# Patient Record
Sex: MALE | Race: WHITE | NOT HISPANIC OR LATINO | URBAN - METROPOLITAN AREA
[De-identification: names, ages, dates, MRNs, and addresses within clinical notes are randomized per-mention and may not be internally consistent; named-entity substitution may affect disease eponyms.]

---

## 2022-05-18 ENCOUNTER — CLAIMS CREATED FROM THE CLAIM WINDOW (OUTPATIENT)
Dept: URBAN - METROPOLITAN AREA MEDICAL CENTER 2 | Facility: MEDICAL CENTER | Age: 77
End: 2022-05-18
Payer: MEDICARE

## 2022-05-18 DIAGNOSIS — Z79.1 ENCNTR LONG-TERM NSAID USE: ICD-10-CM

## 2022-05-18 DIAGNOSIS — D50.0 IRON DEFICIENCY ANEMIA SECONDARY TO BLOOD LOSS (CHRONIC): ICD-10-CM

## 2022-05-18 DIAGNOSIS — N18.9 CKD (CHRONIC KIDNEY DISEASE): ICD-10-CM

## 2022-05-18 DIAGNOSIS — R10.13 EPIGASTRIC PAIN: ICD-10-CM

## 2022-05-18 DIAGNOSIS — R19.5 ABNORMAL FECES: ICD-10-CM

## 2022-05-18 PROCEDURE — 99222 1ST HOSP IP/OBS MODERATE 55: CPT | Performed by: INTERNAL MEDICINE

## 2022-05-18 PROCEDURE — 99223 1ST HOSP IP/OBS HIGH 75: CPT | Performed by: INTERNAL MEDICINE

## 2022-05-19 ENCOUNTER — CLAIMS CREATED FROM THE CLAIM WINDOW (OUTPATIENT)
Dept: URBAN - METROPOLITAN AREA MEDICAL CENTER 2 | Facility: MEDICAL CENTER | Age: 77
End: 2022-05-19
Payer: MEDICARE

## 2022-05-19 DIAGNOSIS — K29.60 ADENOPAPILLOMATOSIS GASTRICA: ICD-10-CM

## 2022-05-19 DIAGNOSIS — K20.80 LOS ANGELES GRADE A ESOPHAGITIS: ICD-10-CM

## 2022-05-19 DIAGNOSIS — K26.4 BLEEDING DUODENAL ULCER: ICD-10-CM

## 2022-05-19 DIAGNOSIS — K92.0 BLOODY VOMITUS: ICD-10-CM

## 2022-05-19 PROCEDURE — 43255 EGD CONTROL BLEEDING ANY: CPT | Performed by: INTERNAL MEDICINE

## 2022-05-19 PROCEDURE — 43239 EGD BIOPSY SINGLE/MULTIPLE: CPT | Performed by: INTERNAL MEDICINE

## 2022-05-20 ENCOUNTER — CLAIMS CREATED FROM THE CLAIM WINDOW (OUTPATIENT)
Dept: URBAN - METROPOLITAN AREA MEDICAL CENTER 2 | Facility: MEDICAL CENTER | Age: 77
End: 2022-05-20
Payer: MEDICARE

## 2022-05-20 DIAGNOSIS — K20.80 LOS ANGELES GRADE A ESOPHAGITIS: ICD-10-CM

## 2022-05-20 DIAGNOSIS — K26.4 CHRONIC OR UNSPECIFIED DUODENAL ULCER WITH HEMORRHAGE: ICD-10-CM

## 2022-05-20 DIAGNOSIS — D62 ACUTE BLOOD LOSS ANEMIA: ICD-10-CM

## 2022-05-20 DIAGNOSIS — R10.816 ABDOMINAL TENDERNESS, EPIGASTRIC: ICD-10-CM

## 2022-05-20 DIAGNOSIS — K29.60 ADENOPAPILLOMATOSIS GASTRICA: ICD-10-CM

## 2022-05-20 PROCEDURE — 99232 SBSQ HOSP IP/OBS MODERATE 35: CPT | Performed by: INTERNAL MEDICINE

## 2022-05-31 ENCOUNTER — OFFICE VISIT (OUTPATIENT)
Dept: URBAN - METROPOLITAN AREA CLINIC 113 | Facility: CLINIC | Age: 77
End: 2022-05-31

## 2022-06-01 ENCOUNTER — OFFICE VISIT (OUTPATIENT)
Dept: URBAN - METROPOLITAN AREA CLINIC 113 | Facility: CLINIC | Age: 77
End: 2022-06-01
Payer: MEDICARE

## 2022-06-01 VITALS
DIASTOLIC BLOOD PRESSURE: 49 MMHG | HEIGHT: 70 IN | BODY MASS INDEX: 23.84 KG/M2 | WEIGHT: 166.5 LBS | TEMPERATURE: 97.4 F | SYSTOLIC BLOOD PRESSURE: 79 MMHG | RESPIRATION RATE: 16 BRPM | HEART RATE: 85 BPM

## 2022-06-01 DIAGNOSIS — K26.9 DUODENAL ULCER: ICD-10-CM

## 2022-06-01 DIAGNOSIS — D50.0 IRON DEFICIENCY ANEMIA DUE TO CHRONIC BLOOD LOSS: ICD-10-CM

## 2022-06-01 DIAGNOSIS — I95.89 OTHER SPECIFIED HYPOTENSION: ICD-10-CM

## 2022-06-01 PROCEDURE — 99203 OFFICE O/P NEW LOW 30 MIN: CPT

## 2022-06-01 RX ORDER — PANTOPRAZOLE SODIUM 40 MG/1
1 TABLET TABLET, DELAYED RELEASE ORAL ONCE A DAY
Qty: 90 TABLET | Refills: 2 | OUTPATIENT
Start: 2022-06-01

## 2022-06-01 RX ORDER — DORZOLAMIDE HYDROCHLORIDE AND TIMOLOL MALEATE 20; 5 MG/ML; MG/ML
1 DROP INTO AFFECTED EYE SOLUTION/ DROPS OPHTHALMIC TWICE A DAY
Status: ACTIVE | COMMUNITY
Start: 2022-06-01

## 2022-06-01 RX ORDER — LATANOPROST 50 UG/ML
1 DROP INTO AFFECTED EYE IN THE EVENING SOLUTION/ DROPS OPHTHALMIC ONCE A DAY
Status: ACTIVE | COMMUNITY
Start: 2022-06-01

## 2022-06-01 RX ORDER — ATORVASTATIN CALCIUM 40 MG/1
1 TABLET TABLET, FILM COATED ORAL ONCE A DAY
Status: ACTIVE | COMMUNITY

## 2022-06-01 NOTE — HPI-TODAY'S VISIT:
76-year-old male with a history of chronic kidney disease, hyperlipidemia presents for follow-up after hospitalization.  He was seen as inpatient consultation at [facility] by Dr. Chaudhry on 2022 for anemia.  Hemoglobin on admission was 7 which trended down to 5.8.  He received 3 units of blood.  Hemoccult was positive.  He was planned for an EGD with consideration of colonoscopy if unremarkable.  Hospital EGD 2022:LA grade a reflux esophagitis.  Small hiatal hernia.  Diffuse gastric erythema which was biopsied to rule out H. pylori.  1 nonobstructing oozing duodenal ulcer with a visible vessel circumferentially injected with epinephrine and cautery ablated.  Pathology revealed mild chronic gastritis with fundic type mucosa, negative for H. pylori.  CT scan of the abdomen/pelvis with IV contrast 2022:Mild constipation.  Several small bilateral intrarenal calculi with no hydronephrosis or perirenal stranding.  No mesenteric induration or free fluid.  Otherwise, negative.  Patient was discharged on 2022 in stable condition.  It was recommended to discontinue diclofenac and continue with PPI therapy twice daily and iron supplement daily for the next 3 to 4 months.   He denies dizziness and light headedness. He denies fatigue however he has been sleeping more than usual. He states his energy is "not bad." He denies shortness of breath. He continues to take iron daily. HE has had two dark stools since his discharge. Patient provided a photo on his cell phone which appeared as dark green stool. He is long taking NSAIDs. He is no longer on PPI therapy. He had a colonoscopy over 20 years ago which revealed polyps. He denies a family history of colon cancer. His father  at 74 from gastric cancer.

## 2022-06-02 LAB
A/G RATIO: 1.8
ALBUMIN: 3.9
ALKALINE PHOSPHATASE: 103
ALT (SGPT): 14
AST (SGOT): 19
BASO (ABSOLUTE): 0.1
BASOS: 1
BILIRUBIN, TOTAL: 0.5
BUN/CREATININE RATIO: 17
BUN: 26
CALCIUM: 9.1
CARBON DIOXIDE, TOTAL: 21
CHLORIDE: 105
CREATININE: 1.57
EGFR: 45
EOS (ABSOLUTE): 0.4
EOS: 5
FERRITIN, SERUM: 98
GLOBULIN, TOTAL: 2.2
GLUCOSE: 165
HEMATOCRIT: 30.4
HEMATOLOGY COMMENTS:: (no result)
HEMOGLOBIN: 10
IMMATURE CELLS: (no result)
IMMATURE GRANS (ABS): 0
IMMATURE GRANULOCYTES: 0
IRON BIND.CAP.(TIBC): 298
IRON SATURATION: 21
IRON: 64
LYMPHS (ABSOLUTE): 0.9
LYMPHS: 12
MCH: 32.3
MCHC: 32.9
MCV: 98
MONOCYTES(ABSOLUTE): 0.4
MONOCYTES: 6
NEUTROPHILS (ABSOLUTE): 5.7
NEUTROPHILS: 76
NRBC: (no result)
PLATELETS: 241
POTASSIUM: 4.6
PROTEIN, TOTAL: 6.1
RBC: 3.1
RDW: 14.7
SODIUM: 140
UIBC: 234
WBC: 7.5

## 2022-07-13 ENCOUNTER — OFFICE VISIT (OUTPATIENT)
Dept: URBAN - METROPOLITAN AREA CLINIC 113 | Facility: CLINIC | Age: 77
End: 2022-07-13
Payer: MEDICARE

## 2022-07-13 VITALS
SYSTOLIC BLOOD PRESSURE: 97 MMHG | HEART RATE: 68 BPM | DIASTOLIC BLOOD PRESSURE: 62 MMHG | HEIGHT: 70 IN | BODY MASS INDEX: 24.05 KG/M2 | TEMPERATURE: 98.3 F | WEIGHT: 168 LBS

## 2022-07-13 DIAGNOSIS — R63.0 DECREASED APPETITE: ICD-10-CM

## 2022-07-13 DIAGNOSIS — K26.9 DUODENAL ULCER: ICD-10-CM

## 2022-07-13 DIAGNOSIS — R53.83 FATIGUE, UNSPECIFIED TYPE: ICD-10-CM

## 2022-07-13 DIAGNOSIS — I95.89 OTHER SPECIFIED HYPOTENSION: ICD-10-CM

## 2022-07-13 DIAGNOSIS — D50.0 IRON DEFICIENCY ANEMIA DUE TO CHRONIC BLOOD LOSS: ICD-10-CM

## 2022-07-13 PROBLEM — 64379006: Status: ACTIVE | Noted: 2022-07-13

## 2022-07-13 PROCEDURE — 99214 OFFICE O/P EST MOD 30 MIN: CPT

## 2022-07-13 RX ORDER — FERROUS SULFATE 325(65) MG
1 TABLET TABLET ORAL ONCE A DAY
Status: ACTIVE | COMMUNITY

## 2022-07-13 RX ORDER — DORZOLAMIDE HYDROCHLORIDE AND TIMOLOL MALEATE 20; 5 MG/ML; MG/ML
1 DROP INTO AFFECTED EYE SOLUTION/ DROPS OPHTHALMIC TWICE A DAY
Status: ON HOLD | COMMUNITY
Start: 2022-06-01

## 2022-07-13 RX ORDER — POLYETHYLENE GLYCOL 3350, SODIUM CHLORIDE, SODIUM BICARBONATE, POTASSIUM CHLORIDE 420; 11.2; 5.72; 1.48 G/4L; G/4L; G/4L; G/4L
420 G POWDER, FOR SOLUTION ORAL ONCE
Qty: 420 G | Refills: 0 | OUTPATIENT
Start: 2022-07-13 | End: 2022-07-14

## 2022-07-13 RX ORDER — DOCUSATE SODIUM 100 MG/1
1 TABLET AS NEEDED TABLET ORAL ONCE A DAY
Status: ACTIVE | COMMUNITY

## 2022-07-13 RX ORDER — PANTOPRAZOLE SODIUM 40 MG/1
1 TABLET TABLET, DELAYED RELEASE ORAL ONCE A DAY
OUTPATIENT
Start: 2022-06-01

## 2022-07-13 RX ORDER — LATANOPROST 50 UG/ML
1 DROP INTO AFFECTED EYE IN THE EVENING SOLUTION/ DROPS OPHTHALMIC ONCE A DAY
Status: ON HOLD | COMMUNITY
Start: 2022-06-01

## 2022-07-13 RX ORDER — ATORVASTATIN CALCIUM 40 MG/1
1 TABLET TABLET, FILM COATED ORAL ONCE A DAY
Status: ACTIVE | COMMUNITY

## 2022-07-13 RX ORDER — PANTOPRAZOLE SODIUM 40 MG/1
1 TABLET TABLET, DELAYED RELEASE ORAL ONCE A DAY
Qty: 90 TABLET | Refills: 2 | Status: ACTIVE | COMMUNITY
Start: 2022-06-01

## 2022-07-13 NOTE — HPI-TODAY'S VISIT:
76-year-old male with a history of chronic kidney disease, hyperlipidemia presents for follow-up.  He was last seen on 6/1/2022 after hospitalization for symptomatic anemia.  He was found to have an upper GI bleed secondary to a bleeding duodenal ulcer.  Ulcer was treated with epinephrine and cauterized.  He did admit to excessive fatigue though denied dizziness or shortness of breath.  He continued to take daily iron supplement.  He did report to dark stools postdischarge though when presented a picture of the stool they appear dark green.  Patient was restarted on PPI therapy and plan for repeat CBC and iron studies to trend.  Of note, he did have a low blood pressure reading of 79/49 during office visit.  There was some concern given recent upper GI bleed.  He was recommended to present to the ER should he experience presyncope, dizziness or weakness.  We would consider repeat EGD pending labs.  Labs 6/2/2022:Normal iron studies, normal ferritin, low hemoglobin of 10, low hematocrit 30.4, normal MCV, elevated glucose 165, elevated creatinine 1.57, low GFR 45, normal LFTs.  As hemoglobin had improved and iron levels were normal, he was recommended to continue with iron supplement for now.  Patient continues to have marked fatigue. He sleeps "15 hours out of the day." He feels has no energy. He denies shortness of breath or chest pain. He denies blood per rectum, hematemesis or melena. He denies abdominal pain or unintentional weight loss. He admits to decreased appetite. He had one normal colonoscopy at the age of 49. He has yet to inform his PCP about his low blood pressure and fatigue. He has had B12 deficiency in the past. He is remaining hydrated. His PCP is Charito Park NP in [place], [place].

## 2022-07-13 NOTE — HPI-OTHER HISTORIES
Hospital EGD 5/19/2022:LA grade a reflux esophagitis.  Small hiatal hernia.  Diffuse gastric erythema which was biopsied to rule out H. pylori.  1 nonobstructing oozing duodenal ulcer with a visible vessel circumferentially injected with epinephrine and cautery ablated.  Pathology revealed mild chronic gastritis with fundic type mucosa, negative for H. pylori.  CT scan of the abdomen/pelvis with IV contrast 5/17/2022:Mild constipation.  Several small bilateral intrarenal calculi with no hydronephrosis or perirenal stranding.  No mesenteric induration or free fluid.  Otherwise, negative.

## 2022-07-14 ENCOUNTER — TELEPHONE ENCOUNTER (OUTPATIENT)
Dept: URBAN - METROPOLITAN AREA CLINIC 113 | Facility: CLINIC | Age: 77
End: 2022-07-14

## 2022-07-14 LAB
A/G RATIO: 1.7
ALBUMIN: 3.9
ALKALINE PHOSPHATASE: 113
ALT (SGPT): 12
AST (SGOT): 16
BASO (ABSOLUTE): 0.1
BASOS: 1
BILIRUBIN, TOTAL: 0.3
BUN/CREATININE RATIO: 19
BUN: 29
C-REACTIVE PROTEIN, QUANT: 2
CALCIUM: 9.5
CARBON DIOXIDE, TOTAL: 21
CHLORIDE: 106
CREATININE: 1.51
EGFR: 47
EOS (ABSOLUTE): 0.6
EOS: 8
FERRITIN, SERUM: 79
FOLATE (FOLIC ACID), SERUM: >20
GLOBULIN, TOTAL: 2.3
GLUCOSE: 126
HEMATOCRIT: 36.6
HEMATOLOGY COMMENTS:: (no result)
HEMOGLOBIN: 12
IMMATURE CELLS: (no result)
IMMATURE GRANS (ABS): 0
IMMATURE GRANULOCYTES: 0
IRON BIND.CAP.(TIBC): 279
IRON SATURATION: 21
IRON: 59
LYMPHS (ABSOLUTE): 1.4
LYMPHS: 18
MCH: 31.3
MCHC: 32.8
MCV: 95
MONOCYTES(ABSOLUTE): 0.6
MONOCYTES: 8
NEUTROPHILS (ABSOLUTE): 5
NEUTROPHILS: 65
NRBC: (no result)
PLATELETS: 175
POTASSIUM: 4.6
PROTEIN, TOTAL: 6.2
RBC: 3.84
RDW: 12
SEDIMENTATION RATE-WESTERGREN: 13
SODIUM: 143
UIBC: 220
VITAMIN B12: 711
WBC: 7.5

## 2022-08-22 ENCOUNTER — CLAIMS CREATED FROM THE CLAIM WINDOW (OUTPATIENT)
Dept: URBAN - METROPOLITAN AREA CLINIC 4 | Facility: CLINIC | Age: 77
End: 2022-08-22
Payer: MEDICARE

## 2022-08-22 ENCOUNTER — OFFICE VISIT (OUTPATIENT)
Dept: URBAN - METROPOLITAN AREA SURGERY CENTER 25 | Facility: SURGERY CENTER | Age: 77
End: 2022-08-22
Payer: MEDICARE

## 2022-08-22 DIAGNOSIS — Q43.8 REDUNDANT COLON: ICD-10-CM

## 2022-08-22 DIAGNOSIS — D50.9 ANEMIA, IRON DEFICIENCY: ICD-10-CM

## 2022-08-22 DIAGNOSIS — K63.89 OTHER SPECIFIED DISEASES OF INTESTINE: ICD-10-CM

## 2022-08-22 DIAGNOSIS — Z12.11 ENCOUNTER FOR SCREENING FOR MALIGNANT NEOPLASM OF COLON: ICD-10-CM

## 2022-08-22 DIAGNOSIS — Z86.010 PERSONAL HISTORY OF COLONIC POLYPS: ICD-10-CM

## 2022-08-22 PROCEDURE — G8907 PT DOC NO EVENTS ON DISCHARG: HCPCS | Performed by: INTERNAL MEDICINE

## 2022-08-22 PROCEDURE — 45385 COLONOSCOPY W/LESION REMOVAL: CPT | Performed by: INTERNAL MEDICINE

## 2022-08-22 PROCEDURE — 88302 TISSUE EXAM BY PATHOLOGIST: CPT | Performed by: PATHOLOGY

## 2022-08-22 RX ORDER — LATANOPROST 50 UG/ML
1 DROP INTO AFFECTED EYE IN THE EVENING SOLUTION/ DROPS OPHTHALMIC ONCE A DAY
Status: ON HOLD | COMMUNITY
Start: 2022-06-01

## 2022-08-22 RX ORDER — PANTOPRAZOLE SODIUM 40 MG/1
1 TABLET TABLET, DELAYED RELEASE ORAL ONCE A DAY
Status: ACTIVE | COMMUNITY
Start: 2022-06-01

## 2022-08-22 RX ORDER — DOCUSATE SODIUM 100 MG/1
1 TABLET AS NEEDED TABLET ORAL ONCE A DAY
Status: ACTIVE | COMMUNITY

## 2022-08-22 RX ORDER — DORZOLAMIDE HYDROCHLORIDE AND TIMOLOL MALEATE 20; 5 MG/ML; MG/ML
1 DROP INTO AFFECTED EYE SOLUTION/ DROPS OPHTHALMIC TWICE A DAY
Status: ON HOLD | COMMUNITY
Start: 2022-06-01

## 2022-08-22 RX ORDER — ATORVASTATIN CALCIUM 40 MG/1
1 TABLET TABLET, FILM COATED ORAL ONCE A DAY
Status: ACTIVE | COMMUNITY

## 2022-08-22 RX ORDER — FERROUS SULFATE 325(65) MG
1 TABLET TABLET ORAL ONCE A DAY
Status: ACTIVE | COMMUNITY

## 2022-08-31 PROBLEM — 87522002 IRON DEFICIENCY ANEMIA: Status: ACTIVE | Noted: 2022-08-31

## 2022-08-31 PROBLEM — 10331000132107 TORTUOUS COLON: Status: ACTIVE | Noted: 2022-08-31

## 2022-10-12 ENCOUNTER — OFFICE VISIT (OUTPATIENT)
Dept: URBAN - METROPOLITAN AREA CLINIC 113 | Facility: CLINIC | Age: 77
End: 2022-10-12
Payer: MEDICARE

## 2022-10-12 VITALS
BODY MASS INDEX: 24.77 KG/M2 | HEART RATE: 75 BPM | TEMPERATURE: 97.5 F | SYSTOLIC BLOOD PRESSURE: 111 MMHG | WEIGHT: 173 LBS | RESPIRATION RATE: 16 BRPM | DIASTOLIC BLOOD PRESSURE: 69 MMHG | HEIGHT: 70 IN

## 2022-10-12 DIAGNOSIS — D50.0 ANEMIA DUE TO GI BLOOD LOSS: ICD-10-CM

## 2022-10-12 DIAGNOSIS — K57.30 COLON, DIVERTICULOSIS: ICD-10-CM

## 2022-10-12 DIAGNOSIS — Z86.010 HISTORY OF COLON POLYPS: ICD-10-CM

## 2022-10-12 DIAGNOSIS — K26.9 DUODENAL ULCER: ICD-10-CM

## 2022-10-12 PROCEDURE — 99213 OFFICE O/P EST LOW 20 MIN: CPT | Performed by: INTERNAL MEDICINE

## 2022-10-12 RX ORDER — DOCUSATE SODIUM 100 MG/1
1 TABLET AS NEEDED TABLET ORAL ONCE A DAY
Status: ON HOLD | COMMUNITY

## 2022-10-12 RX ORDER — FERROUS SULFATE 325(65) MG
1 TABLET TABLET ORAL ONCE A DAY
Status: ON HOLD | COMMUNITY

## 2022-10-12 RX ORDER — LATANOPROST 50 UG/ML
1 DROP INTO AFFECTED EYE IN THE EVENING SOLUTION/ DROPS OPHTHALMIC ONCE A DAY
Status: ON HOLD | COMMUNITY
Start: 2022-06-01

## 2022-10-12 RX ORDER — ATORVASTATIN CALCIUM 40 MG/1
1 TABLET TABLET, FILM COATED ORAL ONCE A DAY
Status: ON HOLD | COMMUNITY

## 2022-10-12 RX ORDER — PANTOPRAZOLE SODIUM 40 MG/1
1 TABLET TABLET, DELAYED RELEASE ORAL ONCE A DAY
Qty: 30 | Refills: 5 | OUTPATIENT
Start: 2022-10-12

## 2022-10-12 RX ORDER — DORZOLAMIDE HYDROCHLORIDE AND TIMOLOL MALEATE 20; 5 MG/ML; MG/ML
1 DROP INTO AFFECTED EYE SOLUTION/ DROPS OPHTHALMIC TWICE A DAY
Status: ON HOLD | COMMUNITY
Start: 2022-06-01

## 2022-10-12 RX ORDER — PANTOPRAZOLE SODIUM 40 MG/1
1 TABLET TABLET, DELAYED RELEASE ORAL ONCE A DAY
Status: ACTIVE | COMMUNITY
Start: 2022-06-01

## 2022-10-12 NOTE — HPI-TODAY'S VISIT:
Mr. Celaya is a 76-year-old male with a history of chronic kidney disease presenting for follow up after colonoscopy for colon polyp surveillance.  The colonoscopy was performed on 8/22/22.  The colonoscopy findings were notable for sigmoid colon diverticulosis and removal of a 5 mm colon polyp (polyp not recovered for pathology).  He is overall doing very well except for decreased energy.  He reports that he sleeps about 12 hours a day.  He ran out of his prescribed pantoprazole.  He has been using [place] for relief of epigastric discomfort.  No nausea, vomiting, heartburn, dysphagia, abdominal pain, change in bowel habits, melena, BRBPR.  Labs 6/2/2022:Normal iron studies, normal ferritin, low hemoglobin of 10, low hematocrit 30.4, normal MCV, elevated glucose 165, elevated creatinine 1.57, low GFR 45, normal LFTs.

## 2022-10-13 LAB
ABSOLUTE BASOPHILS: 43
ABSOLUTE EOSINOPHILS: 362
ABSOLUTE LYMPHOCYTES: 1498
ABSOLUTE MONOCYTES: 582
ABSOLUTE NEUTROPHILS: 4615
BASOPHILS: 0.6
EOSINOPHILS: 5.1
FERRITIN, SERUM: 73
HEMATOCRIT: 36.6
HEMOGLOBIN: 12.2
IRON BIND.CAP.(TIBC): 291
IRON SATURATION: 25
IRON: 74
LYMPHOCYTES: 21.1
MCH: 29.7
MCHC: 33.3
MCV: 89.1
MONOCYTES: 8.2
MPV: 10.2
NEUTROPHILS: 65
PLATELET COUNT: 153
RDW: 13.2
RED BLOOD CELL COUNT: 4.11
WHITE BLOOD CELL COUNT: 7.1

## 2022-10-15 ENCOUNTER — DASHBOARD ENCOUNTERS (OUTPATIENT)
Age: 77
End: 2022-10-15

## 2022-10-15 ENCOUNTER — TELEPHONE ENCOUNTER (OUTPATIENT)
Dept: URBAN - METROPOLITAN AREA CLINIC 113 | Facility: CLINIC | Age: 77
End: 2022-10-15

## 2022-10-15 PROBLEM — 51868009: Status: ACTIVE | Noted: 2022-06-01

## 2022-10-15 PROBLEM — 733657002: Status: ACTIVE | Noted: 2022-10-15

## 2022-10-15 PROBLEM — 413532003: Status: ACTIVE | Noted: 2022-10-12

## 2022-10-15 PROBLEM — 428283002: Status: ACTIVE | Noted: 2022-10-12

## 2023-04-14 ENCOUNTER — TELEPHONE ENCOUNTER (OUTPATIENT)
Dept: URBAN - METROPOLITAN AREA CLINIC 113 | Facility: CLINIC | Age: 78
End: 2023-04-14

## 2023-04-14 RX ORDER — PANTOPRAZOLE SODIUM 40 MG/1
1 TABLET TABLET, DELAYED RELEASE ORAL ONCE A DAY
Qty: 90 TABLET | Refills: 3
Start: 2022-10-12

## 2023-07-07 ENCOUNTER — ERX REFILL RESPONSE (OUTPATIENT)
Dept: URBAN - METROPOLITAN AREA CLINIC 113 | Facility: CLINIC | Age: 78
End: 2023-07-07

## 2023-07-07 RX ORDER — PANTOPRAZOLE SODIUM 40 MG/1
1 TABLET TABLET, DELAYED RELEASE ORAL ONCE A DAY
OUTPATIENT

## 2023-07-07 RX ORDER — PANTOPRAZOLE SODIUM 40 MG/1
1 TABLET TABLET, DELAYED RELEASE ORAL ONCE A DAY
Qty: 90 TABLET | Refills: 3 | OUTPATIENT

## 2025-01-14 ENCOUNTER — TELEPHONE ENCOUNTER (OUTPATIENT)
Dept: URBAN - METROPOLITAN AREA CLINIC 5 | Facility: CLINIC | Age: 80
End: 2025-01-14